# Patient Record
(demographics unavailable — no encounter records)

---

## 2025-05-01 NOTE — ASSESSMENT
[FreeTextEntry1] :  # Hyponatremia due to Colitis: RESOLVED Na 137 on 4/30 on outside labs During hospitalization, hyponatremia was due to volume depeltion then SIADH improved with brief treatment with salt tabs and fluid restriction.  He has not been on fluid restriction or salt tabs since discharge as instructed and has been doing well.  Will follow up in 1 month with Labs BMP, Ur Na and UOsm

## 2025-05-01 NOTE — HISTORY OF PRESENT ILLNESS
[FreeTextEntry1] : Mr Yvon Cordova is a 68 year old male with history of stage 4 esophageal cancer s/p 6 cycles of Cisplatin, etoposide was on keytruda currently on hold due to proctitis, GERD, hypothyroidism and anxiety  is here for a post hospital discharge follow for hyponatremia.   Hospitalization at [facility], discharged on 4/25/2025: His hyponatremia was attributed to hypotonic hyponatremia for which he was given IV fluids however sodium stayed at 123 mEq.  Nephrology was consulted for his found to have hyponatremia due to SIADH, was placed on fluid restriction and salt tablets for couple of days with improvement in sodium.  Salt tablets were discontinued and fluid restriction was liberated sodium improved to 131 and patient was discharged.  Patient has been feeling well since discharge has appetite has improved and has been hydrating well.  He is on prednisone for his colitis which causes him increased appetite.  He states that he has gained 17 pounds in the past 1 week.  He had his routine follow-up with endocrinology and hematology oncologist yesterday had had labs done with new cancer and blood at [facility]. His serum sodium level on those labs is at 137. His diarrhea is resolved.he has no nausea or vomiting.

## 2025-05-01 NOTE — REVIEW OF SYSTEMS
[Negative] : Heme/Lymph Graft Cartilage Fenestration Text: The cartilage was fenestrated with a 2mm punch biopsy to help facilitate graft survival and healing.